# Patient Record
Sex: MALE | Race: WHITE | Employment: UNEMPLOYED | URBAN - METROPOLITAN AREA
[De-identification: names, ages, dates, MRNs, and addresses within clinical notes are randomized per-mention and may not be internally consistent; named-entity substitution may affect disease eponyms.]

---

## 2021-05-05 NOTE — PROGRESS NOTES
[address]   Cardiac Consultation    Referring Provider:  No primary care provider on file. Chief Complaint   Patient presents with    New Patient    Hypertension        History of Present Illness:   Letha Nascimento is a 45 y.o. male with a history of hypertension, hyperlipidemia and diabetes. He reports a long history of difficult to control HTN. He has not tolerated beta blockers or lisinopril in the past. He was recently seen in the ED at [facility] for HTN emergency, BP was 224/116 he reports he had been feeling poorly,  dizzy and lightheaded, this is the second ED visit in a year for this. He was unable to tolerate the Coreg he was started on. He reports he drinks alcohol regularly , states he has cut back, does not smoke. He is planning to travel to a remote area of [place] next week, he will be staying there for an extended period. Past Medical History:   has a past medical history of Anxiety, Hepatitis C, and Type 2 diabetes mellitus ([address] Surgical History:   has a past surgical history that includes hernia repair (Left) and Cervical discectomy. Social History:   reports that he has never smoked. He has never used smokeless tobacco. He reports current alcohol use. He reports that he does not use drugs. Family History:  He was adopted. Family history is unknown by patient. Home Medications:  Prior to Admission medications    Medication Sig Start Date End Date Taking? Authorizing Provider   amLODIPine (NORVASC) 10 MG tablet Take 10 mg by mouth daily 4/24/21 5/24/21 Yes Historical Provider, MD   diazePAM (VALIUM) 5 MG tablet Take 5 mg by mouth daily as needed. Yes Historical Provider, MD   HYDROcodone-acetaminophen (NORCO) 5-325 MG per tablet Take 1 tablet by mouth daily as needed.    Yes Historical Provider, MD   insulin NPH (HUMULIN N;NOVOLIN N) 100 UNIT/ML injection vial Inject 15-20 Units into the skin daily as needed   Yes Historical Provider, MD valsartan (DIOVAN) 160 MG tablet Take 0.5 tablets by mouth daily Take 0.5 tablet by mouth daily 5/10/21  Yes Jorge Morales MD        Allergies:  Sulfa antibiotics     Review of Systems:   · Constitutional: there has been no unanticipated weight loss. There's been no change in energy level, sleep pattern, or activity level. · Eyes: No visual changes or diplopia. No scleral icterus. · ENT: No Headaches, hearing loss or vertigo. No mouth sores or sore throat. · Cardiovascular: Reviewed in HPI  · Respiratory: No cough or wheezing, no sputum production. No hematemesis. · Gastrointestinal: No abdominal pain, appetite loss, blood in stools. No change in bowel or bladder habits. · Genitourinary: No dysuria, trouble voiding, or hematuria. · Musculoskeletal:  No gait disturbance, weakness or joint complaints. · Integumentary: No rash or pruritis. · Neurological: No headache, diplopia, change in muscle strength, numbness or tingling. No change in gait, balance, coordination, mood, affect, memory, mentation, behavior. · Psychiatric: No anxiety, no depression. · Endocrine: No malaise, fatigue or temperature intolerance. No excessive thirst, fluid intake, or urination. No tremor. · Hematologic/Lymphatic: No abnormal bruising or bleeding, blood clots or swollen lymph nodes. · Allergic/Immunologic: No nasal congestion or hives.     Physical Examination:    Vitals:    05/10/21 0825   BP: (!) 142/88   Pulse: 73   SpO2: 98%        Wt Readings from Last 1 Encounters:   05/10/21 198 lb 6.4 oz (90 kg)       Constitutional and General Appearance: NAD   Skin:good turgor,intact without lesions  HEENT: EOMI ,normal  Neck:no JVD  Respiratory:  · Normal excursion and expansion without use of accessory muscles  · Resp Auscultation: Normal breath sounds without dullness  Cardiovascular:  · The apical impulses not displaced  ·  +Murmur ,systolic ejection murmur  · Cervical veins are not engorged  · The carotid upstroke is normal in amplitude and contour without delay or bruit  · Peripheral pulses are symmetrical and full  · There is no clubbing, cyanosis of the extremities. · No edema  · Femoral Arteries: 2+ and equal  · Pedal Pulses: 2+ and equal   Abdomen:  · No masses or tenderness  · Liver/Spleen: No Abnormalities Noted  Neurological/Psychiatric:  · Alert and oriented in all spheres  · Moves all extremities well  · Exhibits normal gait balance and coordination  · No abnormalities of mood, affect, memory, mentation, or behavior are noted    Echo 4/23/21   1. Left ventricle: The cavity size was normal. Wall thickness was     normal. Systolic function was normal. The estimated ejection     fraction was in the range of 55% to 60%. Wall motion was normal;     there were no regional wall motion abnormalities. Doppler     parameters are consistent with abnormal left ventricular     relaxation (grade 1 diastolic dysfunction). 2. Aortic valve: The valve was possibly bicuspid. The leaflets were     moderately calcified. Cusp separation was reduced. Transvalvular     velocity was increased, due to stenosis. There was mild to     moderate stenosis. Peak velocity (S): 2.93m/sec. Mean gradient     (S): 21mm Hg. 3. Right ventricle: The cavity size was normal. Wall thickness was     normal. Systolic function was normal.  4. Pulmonary Systolic pressure could not be accurately     estimated. Assessment:     Essential hypertension -Blood pressure (!) 142/88, pulse 73, height 5' 10\" (1.778 m), weight 198 lb 6.4 oz (90 kg), SpO2 98 %.   Recheck by me R 168/82  L 164/84   Stop carvedilol (COREG) 12.5 MG tablet   Start Valsartan 80mg daily   Call the office in 1 week with your BP readings - we may need to increase your dose - [phone]   Goal is DBP under 80         Type 2 diabetes mellitus without complication, without long-term current use of insulin ([facility]) 4/22/21 > A1C 8.8 - not well controlled     Aortic valve stenosis/ Bicuspid valve - Echo 4/23/21>  mild to moderate stenosis,  valve was possibly bicuspid    Bilateral carotid artery stenosis - Carotid Doppler > 4/24/21> BICA 16-49% stenosis     Alcohol use - reports he has cut back strongly advised cessation          Plan:  Irina Tipton has a stable cardiac status. Cardiac test and lab results personally reviewed by me during this office visit and discussed. Stop carvedilol (COREG) 12.5 MG tablet   Start Valsartan 80mg daily   Call the office in 1 week with your BP readings - we may need to increase your dose - [phone]   Goal is SBP under 80   Continue risk factor modifications. Call for any change in symptoms, call to report any changes in shortness of breath or development of chest pain with activity. Return for regular follow up in 6 months with echo. I appreciate the opportunity of cooperating in the care of this individual.    Leobardo Montes. Jaclyn Mckeon M.D., [facility] [place]    Patient's problem list, medications, allergies, past medical, surgical, social and family histories were reviewed and updated as appropriate. Scribe's attestation: This note was scribed in the presence of Dr. Jaclyn Mckeon MD, by Tiffanie Finnegan RN. The scribe's documentation has been prepared under my direction and personally reviewed by me in its entirety. I confirm that the note above accurately reflects all work, treatment, procedures, and medical decision making performed by me.

## 2021-05-10 ENCOUNTER — OFFICE VISIT (OUTPATIENT)
Dept: CARDIOLOGY CLINIC | Age: 51
End: 2021-05-10

## 2021-05-10 VITALS
WEIGHT: 198.4 LBS | HEART RATE: 73 BPM | SYSTOLIC BLOOD PRESSURE: 142 MMHG | BODY MASS INDEX: 28.4 KG/M2 | OXYGEN SATURATION: 98 % | HEIGHT: 70 IN | DIASTOLIC BLOOD PRESSURE: 88 MMHG

## 2021-05-10 DIAGNOSIS — E11.9 TYPE 2 DIABETES MELLITUS WITHOUT COMPLICATION, WITHOUT LONG-TERM CURRENT USE OF INSULIN (HCC): ICD-10-CM

## 2021-05-10 DIAGNOSIS — I10 ESSENTIAL HYPERTENSION: Primary | ICD-10-CM

## 2021-05-10 DIAGNOSIS — I35.0 NONRHEUMATIC AORTIC VALVE STENOSIS: ICD-10-CM

## 2021-05-10 DIAGNOSIS — Z78.9 ALCOHOL USE: ICD-10-CM

## 2021-05-10 DIAGNOSIS — I65.23 BILATERAL CAROTID ARTERY STENOSIS: ICD-10-CM

## 2021-05-10 PROCEDURE — 99203 OFFICE O/P NEW LOW 30 MIN: CPT | Performed by: INTERNAL MEDICINE

## 2021-05-10 RX ORDER — VALSARTAN 160 MG/1
80 TABLET ORAL DAILY
Qty: 90 TABLET | Refills: 3 | Status: SHIPPED | OUTPATIENT
Start: 2021-05-10 | End: 2021-11-10 | Stop reason: ALTCHOICE

## 2021-05-10 RX ORDER — HYDROCODONE BITARTRATE AND ACETAMINOPHEN 5; 325 MG/1; MG/1
1 TABLET ORAL DAILY PRN
Status: ON HOLD | COMMUNITY
End: 2022-03-15 | Stop reason: HOSPADM

## 2021-05-10 RX ORDER — CARVEDILOL 25 MG/1
12.5 TABLET ORAL 2 TIMES DAILY
COMMUNITY
Start: 2021-04-24 | End: 2021-05-10 | Stop reason: ALTCHOICE

## 2021-05-10 RX ORDER — AMLODIPINE BESYLATE 10 MG/1
10 TABLET ORAL DAILY
COMMUNITY
Start: 2021-04-24 | End: 2022-03-10

## 2021-05-10 RX ORDER — DIAZEPAM 5 MG/1
5 TABLET ORAL DAILY PRN
COMMUNITY
End: 2022-03-10

## 2021-05-10 NOTE — PATIENT INSTRUCTIONS
Stop carvedilol (COREG) 12.5 MG tablet   Start Valsartan 80mg daily   Call the office in 1 week with your BP readings - we may need to increase your dose - [phone]   Goal is SBP under 80

## 2021-10-14 NOTE — PROGRESS NOTES
[facility], [place], [facility] by [facility]   Cardiac Follow Up     Referring Provider:  AKHIL Hernández - CNP     Chief Complaint   Patient presents with    6 Month Follow-Up    Hypertension        History of Present Illness:   Laurel Garcia is a 45 y.o. male with a history of hypertension, hyperlipidemia and diabetes. He reports a long history of difficult to control HTN. He has not tolerated beta blockers or lisinopril in the past, he was unable to tolerate coreg or diovan. He has a recent back injury, he is in a lot of pain, . He denies any symptoms of headache, dizziness. BP has been elevated at home. Discussed echok results- unchanged from April. States he also has trouble with ability to think      Past Medical History:   has a past medical history of Anxiety, Hepatitis C, and Type 2 diabetes mellitus ([address] also hypertension and now bicuspid aortic valve    Surgical History:   has a past surgical history that includes hernia repair (Left) and Cervical discectomy. Social History:   reports that he has never smoked. He has never used smokeless tobacco. He reports current alcohol use. He reports that he does not use drugs. Family History:  He was adopted. Family history is unknown by patient. Home Medications:  Prior to Admission medications    Medication Sig Start Date End Date Taking? Authorizing Provider   gabapentin (NEURONTIN) 300 MG capsule Take 1 capsule by mouth 4 times daily for 90 doses. 10/26/21 11/18/21 Yes Joseph Felipe MD   diazePAM (VALIUM) 5 MG tablet Take 5 mg by mouth daily as needed. Yes Historical Provider, MD   HYDROcodone-acetaminophen (NORCO) 5-325 MG per tablet Take 1 tablet by mouth daily as needed.    Yes Historical Provider, MD   insulin NPH (HUMULIN N;NOVOLIN N) 100 UNIT/ML injection vial Inject 15-20 Units into the skin 2 times daily (before meals)    Yes Historical Provider, MD   amLODIPine (NORVASC) 10 MG tablet Take 10 mg by mouth daily 4/24/21 5/24/21  Historical Provider, MD        Allergies:  Sulfa antibiotics     Review of Systems:   · Constitutional: there has been no unanticipated weight loss. There's been no change in energy level, sleep pattern, or activity level. · Eyes: No visual changes or diplopia. No scleral icterus. · ENT: No Headaches, hearing loss or vertigo. No mouth sores or sore throat. · Cardiovascular: Reviewed in HPI  · Respiratory: No cough or wheezing, no sputum production. No hematemesis. · Gastrointestinal: No abdominal pain, appetite loss, blood in stools. No change in bowel or bladder habits. · Genitourinary: No dysuria, trouble voiding, or hematuria. · Musculoskeletal:  No gait disturbance, weakness or joint complaints. · Integumentary: No rash or pruritis. · Neurological: No headache, diplopia, change in muscle strength, numbness or tingling. No change in gait, balance, coordination, mood, affect, memory, mentation, behavior. · Psychiatric: No anxiety, no depression. · Endocrine: No malaise, fatigue or temperature intolerance. No excessive thirst, fluid intake, or urination. No tremor. · Hematologic/Lymphatic: No abnormal bruising or bleeding, blood clots or swollen lymph nodes. · Allergic/Immunologic: No nasal congestion or hives.     Physical Examination:    Vitals:    11/10/21 1038   BP: (!) 150/90   Pulse: 84   SpO2: 98%        Wt Readings from Last 1 Encounters:   11/10/21 205 lb (93 kg)       Constitutional and General Appearance: NAD   Skin:good turgor,intact without lesions  HEENT: EOMI ,normal  Neck:no JVD  Respiratory:  · Normal excursion and expansion without use of accessory muscles  · Resp Auscultation: Normal breath sounds without dullness  Cardiovascular:  · The apical impulses not displaced  ·  +Murmur ,systolic ejection murmur  · Cervical veins are not engorged  · The carotid upstroke is normal in amplitude and contour without delay or bruit  · Peripheral pulses are symmetrical and full  · There is no clubbing, cyanosis of the extremities. · No edema  · Femoral Arteries: 2+ and equal  · Pedal Pulses: 2+ and equal   Abdomen:  · No masses or tenderness  · Liver/Spleen: No Abnormalities Noted  Neurological/Psychiatric:  · Alert and oriented in all spheres  · Moves all extremities well  · Exhibits normal gait balance and coordination  · No abnormalities of mood, affect, memory, mentation, or behavior are noted    Echo 4/23/21   1. Left ventricle: The cavity size was normal. Wall thickness was     normal. Systolic function was normal. The estimated ejection     fraction was in the range of 55% to 60%. Wall motion was normal;     there were no regional wall motion abnormalities. Doppler     parameters are consistent with abnormal left ventricular     relaxation (grade 1 diastolic dysfunction). 2. Aortic valve: The valve was possibly bicuspid. The leaflets were     moderately calcified. Cusp separation was reduced. Transvalvular     velocity was increased, due to stenosis. There was mild to     moderate stenosis. Peak velocity (S): 2.93m/sec. Mean gradient     (S): 21mm Hg. 3. Right ventricle: The cavity size was normal. Wall thickness was     normal. Systolic function was normal.  4. Pulmonary Systolic pressure could not be accurately     estimated. Echo 11/10/21   Summary   -Normal left ventricle size, moderate concentric wall thickness and systolic   function with an estimated ejection fraction of 60%. -No regional wall motion abnormalities are seen. -There is reversal of E/A inflow velocities across the mitral valve. -Impaired relaxation compatible with diastolic dysfunction. E/e\"=10.8.   -The aortic valve appears bicuspid . -Moderate aortic stenosis with a peak velocity of 3m/s and a mean pressure   gradient of 22mmHg.   -Trivial tricuspid regurgitation.   -Estimated pulmonary artery systolic pressure is normal at 31 mmHg assuming   a right atrial pressure of 3 mmHg.            Assessment:     Essential hypertension -Blood pressure (!) 150/90, pulse 84, height 5' 10\" (1.778 m), weight 205 lb (93 kg), SpO2 98 %. At last visit -Stopped carvedilol (COREG) 12.5 MG tablet   Started  Valsartan 80mg daily -unable to tolerate   He is reluctant to try another medication- but will attempt Cardura- he is in significant pain due to his back. Type 2 diabetes mellitus without complication, without long-term current use of insulin ([address] 4/22/21 > A1C 8.8 - not well controlled     Aortic valve stenosis/ Bicuspid valve  - Echo 4/23/21>  mild to moderate stenosis,  valve was possibly bicuspid  11/10/21> stable -unchanged since 4/21      Bilateral carotid artery stenosis - Carotid Doppler > 4/24/21> BICA 16-49% stenosis              Plan: Echo 11/10/21 read by me and results discussed with the patient. Ronni Stoll has a stable cardiac status. Cardiac test and lab results personally reviewed by me during this office visit and discussed. Referral to Dr Son Nance sent for 2nd opinion if needed. Wife and him concerned about f/up with Dr Edith Veras although I view him as a good surgeon  Start Cardura 1mg nightly   VV with PCP today -will provide copy of office note   Continue risk factor modifications. Call for any change in symptoms, call to report any changes in shortness of breath or development of chest pain with activity. Return for regular follow up in 6 months. I appreciate the opportunity of cooperating in the care of this individual.    Ramon Fregoso. Petar Garcia M.D., [facility] [place]    Patient's problem list, medications, allergies, past medical, surgical, social and family histories were reviewed and updated as appropriate. Scribe's attestation: This note was scribed in the presence of Dr. Petar Garcia MD, by Cortland Schlatter RN. The scribe's documentation has been prepared under my direction and personally reviewed by me in its entirety.  I confirm that the note above accurately reflects all work, treatment, procedures, and medical decision making performed by me.

## 2021-10-26 ENCOUNTER — OFFICE VISIT (OUTPATIENT)
Dept: ORTHOPEDIC SURGERY | Age: 51
End: 2021-10-26

## 2021-10-26 VITALS — HEIGHT: 70 IN | WEIGHT: 198 LBS | BODY MASS INDEX: 28.35 KG/M2

## 2021-10-26 DIAGNOSIS — M54.50 LUMBAR PAIN: ICD-10-CM

## 2021-10-26 DIAGNOSIS — M51.26 HNP (HERNIATED NUCLEUS PULPOSUS), LUMBAR: Primary | ICD-10-CM

## 2021-10-26 PROCEDURE — 99203 OFFICE O/P NEW LOW 30 MIN: CPT | Performed by: ORTHOPAEDIC SURGERY

## 2021-10-26 RX ORDER — GABAPENTIN 300 MG/1
300 CAPSULE ORAL 4 TIMES DAILY
Qty: 90 CAPSULE | Refills: 0 | Status: SHIPPED | OUTPATIENT
Start: 2021-10-26 | End: 2022-03-10

## 2021-10-26 NOTE — PROGRESS NOTES
New Patient: LUMBAR SPINE    Referring Provider:  Referral, Self    CHIEF COMPLAINT:    Chief Complaint   Patient presents with    Back Pain     lumbar       HISTORY OF PRESENT ILLNESS:    Mr. Carin Alvarez  is a pleasant 45 y.o. male who presents today for evaluation of left buttock and leg pain. He notes his symptoms began with a lifting injury on July 5, 2021. They have steadily increased since that time. His pain radiates posteriorly from his buttocks to his left calf. He rates the intensity of his leg pain 6/10 in his low back pain 0/10. He denies saddle anesthesia or bowel bladder abnormality    Current/Past Treatment:   · Physical Therapy: None  · Chiropractic: 14 visits not helping  · Injection: None  · Medications: None    Past Medical History:   Past Medical History:   Diagnosis Date    Anxiety     Hepatitis C     Type 2 diabetes mellitus (HCC)       Past Surgical History:     Past Surgical History:   Procedure Laterality Date    CERVICAL DISCECTOMY      HERNIA REPAIR Left      Current Medications:     Current Outpatient Medications:     amLODIPine (NORVASC) 10 MG tablet, Take 10 mg by mouth daily, Disp: , Rfl:     diazePAM (VALIUM) 5 MG tablet, Take 5 mg by mouth daily as needed. , Disp: , Rfl:     HYDROcodone-acetaminophen (NORCO) 5-325 MG per tablet, Take 1 tablet by mouth daily as needed. , Disp: , Rfl:     insulin NPH (HUMULIN N;NOVOLIN N) 100 UNIT/ML injection vial, Inject 15-20 Units into the skin daily as needed, Disp: , Rfl:     valsartan (DIOVAN) 160 MG tablet, Take 0.5 tablets by mouth daily Take 0.5 tablet by mouth daily, Disp: 90 tablet, Rfl: 3  Allergies:  Sulfa antibiotics  Social History:    reports that he has never smoked. He has never used smokeless tobacco. He reports current alcohol use. He reports that he does not use drugs.   Family History:   Family History   Adopted: Yes   Family history unknown: Yes       REVIEW OF SYSTEMS: Full ROS noted & scanned   CONSTITUTIONAL: Denies unexplained weight loss, fevers, chills or fatigue  NEUROLOGICAL: Denies unsteady gait or progressive weakness  MUSCULOSKELETAL: Denies joint swelling or redness  PSYCHOLOGICAL: Denies anxiety, depression   SKIN: Denies skin changes, delayed healing, rash, itching   HEMATOLOGIC: Denies easy bleeding or bruising  ENDOCRINE: Denies excessive thirst, urination, heat/cold  RESPIRATORY: Denies current dyspnea, cough  GI: Denies nausea, vomiting, diarrhea   : Denies bowel or bladder issues      PHYSICAL EXAM:    Vitals: Height 5' 10\" (1.778 m), weight 198 lb (89.8 kg). GENERAL EXAM:  · General Apparence: Patient is adequately groomed with no evidence of malnutrition. · Orientation: The patient is oriented to time, place and person. · Mood & Affect:The patient's mood and affect are appropriate. · Vascular: Examination reveals no swelling tenderness in upper or lower extremities. Good capillary refill. · Lymphatic: The lymphatic examination bilaterally reveals all areas to be without enlargement or induration  · Sensation: Sensation is intact without deficit  · Coordination/Balance: Good coordination     LUMBAR/SACRAL EXAMINATION:  · Inspection: Local inspection shows no step-off or bruising. Lumbar alignment is normal.  Sagittal and Coronal balance is neutral.      · Palpation:   No evidence of tenderness at the midline. No tenderness bilaterally at the paraspinal or trochanters. There is no step-off or paraspinal spasm. · Range of Motion: Lumbar flexion, extension and rotation are mildly limited due to pain. · Strength:   Strength testing is 5/5 in all muscle groups tested. · Special Tests:   Straight leg raise positive on the left and crossed SLR negative. Leg length and pelvis level. · Skin: There are no rashes, ulcerations or lesions. · Reflexes: Reflexes are symmetrically 2+ at the patellar and ankle tendons. Clonus absent bilaterally at the feet.   · Gait & station: normal, patient ambulates without assistance    · Additional Examinations:   · RIGHT LOWER EXTREMITY: Inspection/examination of the right lower extremity does not show any tenderness, deformity or injury. Range of motion is unremarkable. There is no gross instability. There are no rashes, ulcerations or lesions. Strength and tone are normal.    · LEFT LOWER EXTREMITY:  Inspection/examination of the left lower extremity does not show any tenderness, deformity or injury. Range of motion is unremarkable. There is no gross instability. There are no rashes, ulcerations or lesions. Strength and tone are normal.    Diagnostic Testing:    I reviewed AP and lateral x-rays of the lumbar spine were obtained in the office today. Those show mild disc degeneration L5-S1    Impression:   Lumbar disc herniation with radiculopathy    Plan:    Discussed treatment options including observation, physical therapy, epidural injection, and additional imaging. He would like to proceed with gabapentin and a lumbar MRI.   He is not willing to try oral steroids or NSAIDs

## 2021-11-08 ENCOUNTER — TELEPHONE (OUTPATIENT)
Dept: ORTHOPEDIC SURGERY | Age: 51
End: 2021-11-08

## 2021-11-08 NOTE — TELEPHONE ENCOUNTER
----- Message from Raul Burris MD sent at 11/4/2021 11:24 AM EDT -----  Lumbar MRI  Disc herniation  Could try lumbar LISA or come in to discuss options

## 2021-11-08 NOTE — TELEPHONE ENCOUNTER
Sp[facility] to the patient and [facility] checking with Dr. Corey Zamora if he will see him.

## 2021-11-08 NOTE — TELEPHONE ENCOUNTER
Test Results     Type of Test: MRI  Date of Test: 11/2/21  Location of Test: [facility]  Patient Contact Number: [phone]    [phone]    WIFE CALLING TO FOLLOW UP ON  RESULTS OF HIS MRI. PLEASE CALL BACK AT THE ABOVE NUMBERS.

## 2021-11-09 DIAGNOSIS — M51.26 HNP (HERNIATED NUCLEUS PULPOSUS), LUMBAR: ICD-10-CM

## 2021-11-09 DIAGNOSIS — M54.50 LUMBAR PAIN: Primary | ICD-10-CM

## 2021-11-10 ENCOUNTER — PROCEDURE VISIT (OUTPATIENT)
Dept: CARDIOLOGY CLINIC | Age: 51
End: 2021-11-10

## 2021-11-10 ENCOUNTER — OFFICE VISIT (OUTPATIENT)
Dept: CARDIOLOGY CLINIC | Age: 51
End: 2021-11-10

## 2021-11-10 VITALS
OXYGEN SATURATION: 98 % | SYSTOLIC BLOOD PRESSURE: 150 MMHG | WEIGHT: 205 LBS | BODY MASS INDEX: 29.35 KG/M2 | HEIGHT: 70 IN | HEART RATE: 84 BPM | DIASTOLIC BLOOD PRESSURE: 90 MMHG

## 2021-11-10 DIAGNOSIS — Z78.9 ALCOHOL USE: ICD-10-CM

## 2021-11-10 DIAGNOSIS — I65.23 BILATERAL CAROTID ARTERY STENOSIS: ICD-10-CM

## 2021-11-10 DIAGNOSIS — I35.0 NONRHEUMATIC AORTIC VALVE STENOSIS: ICD-10-CM

## 2021-11-10 DIAGNOSIS — M54.9 BACK PAIN, UNSPECIFIED BACK LOCATION, UNSPECIFIED BACK PAIN LATERALITY, UNSPECIFIED CHRONICITY: ICD-10-CM

## 2021-11-10 DIAGNOSIS — I35.0 NONRHEUMATIC AORTIC VALVE STENOSIS: Primary | ICD-10-CM

## 2021-11-10 DIAGNOSIS — Q23.1 BICUSPID AORTIC VALVE: ICD-10-CM

## 2021-11-10 DIAGNOSIS — E11.9 TYPE 2 DIABETES MELLITUS WITHOUT COMPLICATION, WITHOUT LONG-TERM CURRENT USE OF INSULIN (HCC): ICD-10-CM

## 2021-11-10 DIAGNOSIS — I10 ESSENTIAL HYPERTENSION: Primary | ICD-10-CM

## 2021-11-10 LAB
LV EF: 60 %
LVEF MODALITY: NORMAL

## 2021-11-10 PROCEDURE — 93306 TTE W/DOPPLER COMPLETE: CPT | Performed by: INTERNAL MEDICINE

## 2021-11-10 PROCEDURE — 99214 OFFICE O/P EST MOD 30 MIN: CPT | Performed by: INTERNAL MEDICINE

## 2021-11-10 RX ORDER — DOXAZOSIN MESYLATE 1 MG/1
1 TABLET ORAL NIGHTLY
Qty: 90 TABLET | Refills: 3 | Status: SHIPPED | OUTPATIENT
Start: 2021-11-10

## 2021-11-10 NOTE — PATIENT INSTRUCTIONS
[facility], [address] [place] [address],[address], [place]  [address] [facility], [facility] [address] [facility]  Ph: [phone]  [facility]:[phone]    Start Cardura nightly

## 2021-11-12 ENCOUNTER — TELEPHONE (OUTPATIENT)
Dept: ORTHOPEDIC SURGERY | Age: 51
End: 2021-11-12

## 2022-01-26 ENCOUNTER — TELEPHONE (OUTPATIENT)
Dept: ORTHOPEDIC SURGERY | Age: 52
End: 2022-01-26

## 2022-03-09 ENCOUNTER — HOSPITAL ENCOUNTER (OUTPATIENT)
Age: 52
Discharge: HOME | End: 2022-03-09
Payer: COMMERCIAL

## 2022-03-09 DIAGNOSIS — Z01.818 PREOP TESTING: ICD-10-CM

## 2022-03-09 LAB
ANION GAP SERPL CALCULATED.3IONS-SCNC: 17 MMOL/L (ref 3–16)
APTT: 36.5 SEC (ref 26.2–38.6)
BUN BLDV-MCNC: 27 MG/DL (ref 7–20)
CALCIUM SERPL-MCNC: 9.7 MG/DL (ref 8.3–10.6)
CHLORIDE BLD-SCNC: 100 MMOL/L (ref 99–110)
CO2: 23 MMOL/L (ref 21–32)
CREAT SERPL-MCNC: 1.6 MG/DL (ref 0.9–1.3)
GFR AFRICAN AMERICAN: 55
GFR NON-AFRICAN AMERICAN: 45
GLUCOSE BLD-MCNC: 141 MG/DL (ref 70–99)
HCT VFR BLD CALC: 40.3 % (ref 40.5–52.5)
HEMOGLOBIN: 13.8 G/DL (ref 13.5–17.5)
INR BLD: 0.9 (ref 0.88–1.12)
MCH RBC QN AUTO: 30 PG (ref 26–34)
MCHC RBC AUTO-ENTMCNC: 34.3 G/DL (ref 31–36)
MCV RBC AUTO: 87.6 FL (ref 80–100)
PDW BLD-RTO: 12.9 % (ref 12.4–15.4)
PLATELET # BLD: 263 K/UL (ref 135–450)
PMV BLD AUTO: 8.2 FL (ref 5–10.5)
POTASSIUM SERPL-SCNC: 5 MMOL/L (ref 3.5–5.1)
PROTHROMBIN TIME: 10.1 SEC (ref 9.9–12.7)
RBC # BLD: 4.6 M/UL (ref 4.2–5.9)
SODIUM BLD-SCNC: 140 MMOL/L (ref 136–145)
WBC # BLD: 4.1 K/UL (ref 4–11)

## 2022-03-09 PROCEDURE — U0005 INFEC AGEN DETEC AMPLI PROBE: HCPCS

## 2022-03-09 PROCEDURE — U0003 INFECTIOUS AGENT DETECTION BY NUCLEIC ACID (DNA OR RNA); SEVERE ACUTE RESPIRATORY SYNDROME CORONAVIRUS 2 (SARS-COV-2) (CORONAVIRUS DISEASE [COVID-19]), AMPLIFIED PROBE TECHNIQUE, MAKING USE OF HIGH THROUGHPUT TECHNOLOGIES AS DESCRIBED BY CMS-2020-01-R: HCPCS

## 2022-03-09 PROCEDURE — 85730 THROMBOPLASTIN TIME PARTIAL: CPT

## 2022-03-09 PROCEDURE — 36415 COLL VENOUS BLD VENIPUNCTURE: CPT

## 2022-03-09 PROCEDURE — 85610 PROTHROMBIN TIME: CPT

## 2022-03-09 PROCEDURE — 85027 COMPLETE CBC AUTOMATED: CPT

## 2022-03-09 PROCEDURE — 80048 BASIC METABOLIC PNL TOTAL CA: CPT

## 2022-03-10 LAB — SARS-COV-2: NOT DETECTED

## 2022-03-10 NOTE — PROGRESS NOTES
Name_______________________________________Printed:____________________  Date and time of xmylikc__[id number]____[id number]__________________Anzyjqz Time:_1145  main_______________   1. The instructions given regarding when and if a patient needs to stop oral intake prior to surgery varies. Follow the specific instructions you were given                  __x_Nothing to eat or to drink after Midnight the night before.                   ____Carbo loading or ERAS instructions will be given to select patients-if you have been given those instructions -please do the following                           The evening before your surgery after dinner before midnight drink 40 ounces of gatorade. If you are diabetic use sugar free. The morning of surgery drink 40 ounces of water. This needs to be finished 3 hours prior to your surgery start time. 2. Take the following pills with a small sip of water on the morning of surgery_amlodipine, pain pill__________________________________________________                  Do not take blood pressure medications ending in pril or sartan the chasidy prior to surgery or the morning of surgery_   3. Aspirin, Ibuprofen, Advil, Naproxen, Vitamin E and other Anti-inflammatory products and supplements should be stopped for 5 -7days before surgery or as directed by your physician. 4. Check with your Doctor regarding stopping Plavix, Coumadin,Eliquis, Lovenox,Effient,Pradaxa,Xarelto, Fragmin or other blood thinners and follow their instructions. 5. Do not smoke, and do not drink any alcoholic beverages 24 hours prior to surgery. This includes NA Beer. Refrain from the usage of any recreational drugs. 6. You may brush your teeth and gargle the morning of surgery. DO NOT SWALLOW WATER   7. You MUST make arrangements for a responsible adult to stay on site while you are here and take you home after your surgery. You will not be allowed to leave alone or drive yourself home.   It is strongly suggested someone stay with you the first 24 hrs. Your surgery will be cancelled if you do not have a ride home. 8. A parent/legal guardian must accompany a child scheduled for surgery and plan to stay at the hospital until the child is discharged. Please do not bring other children with you. 9. Please wear simple, loose fitting clothing to the hospital.  Bimal Reed not bring valuables (money, credit cards, checkbooks, etc.) Do not wear any makeup (including no eye makeup) or nail polish on your fingers or toes. 10. DO NOT wear any jewelry or piercings on day of surgery. All body piercing jewelry must be removed. 11. If you have ___dentures, they will be removed before going to the OR; we will provide you a container. If you wear ___contact lenses or _x__glasses, they will be removed; please bring a case for them. 12. Please see your family doctor/pediatrician for a history & physical and/or concerning medications. Bring any test results/reports from your physician's office. PCP__________________Phone___________H&P Appt. Date________             13 If you  have a Living Will and Durable Power of  for Healthcare, please bring in a copy. 15. Notify your Surgeon if you develop any illness between now and surgery  time, cough, cold, fever, sore throat, nausea, vomiting, etc.  Please notify your surgeon if you experience dizziness, shortness of breath or blurred vision between now & the time of your surgery             15. DO NOT shave your operative site 96 hours prior to surgery. For face & neck surgery, men may use an electric razor 48 hours prior to surgery. 16. Shower the night before or morning of surgery using an antibacterial soap or as you have been instructed. 17. To provide excellent care visitors will be limited to one in the room at any given time. 18.  Please bring picture ID and insurance card.              19.  Visit our web site for additional information:  [email or web address]              20.During flu season no children under the age of 15 are permitted in the hospital for the safety of all patients. 21. If you take a long acting insulin in the evening only  take half of your usual  dose the night  before your procedure              22. If you use a c-pap please bring DOS if staying overnight,             23.For your convenience [facility] has a pharmacy on site to fill your prescriptions. 24. If you use oxygen and have a portable tank please bring it  with you the DOS             25. Bring a complete list of all your medications with name and dose include any supplements. 26. Other__________________________________________   *Please call pre admission testing if you any further questions   Agapito Castellon   [address]    [place]  [phone]   [facility] [address] JOANNE KHAN   [phone]           COVID TESTING    _x__ Covid test to be done 3-5 days prior to scheduled surgery -patient aware they are REQUIRED to bring a copy of the negative result DOS-if they receive a positive result to notify their surgeon         If known - indicate where patient is getting covid test done __Mff 3/9/2022_________________________________________________________    ___ Rapid - DOS    ___ Other___________________________________      [employer] POLICY(subject to change)    There is a one visitor policy at [facility] for all surgeries and endoscopies. Whether the visitor can stay or will be asked to wait in the car will depend on the current policy and if social distancing can be maintained. The policy is subject to change at any time. Please make sure the visitor has a cell phone that is on,charged and able to accept calls, as this may be the way that the staff communicates with them. Pain management is NO VISITOR policyThe patients ride is expected to remain in the car with a cell phone for communication. If the ride is leaving the hospital grounds please make sure they are back in time for pickup. Have the patient inform the staff on arrival what their rides plans are while the patient is in the facility. At the MAIN there is one visitor allowed. Please note that the visitor policy is subject to change. All above information reviewed with patient in person or by phone. Patient verbalizes understanding. All questions and concerns addressed.                                                                                                  Patient/Rep_patient___________________                                                                                                                                    PRE OP INSTRUCTIONS

## 2022-03-14 ENCOUNTER — TELEPHONE (OUTPATIENT)
Dept: CARDIOLOGY CLINIC | Age: 52
End: 2022-03-14

## 2022-03-14 NOTE — TELEPHONE ENCOUNTER
CARDIAC CLEARANCE     What type of procedure are you having? Right L5  F1     Which physician is performing your procedure? Dr Laureano Inch    When is your procedure scheduled for? 3/15/22    [facility] you [facility]  Are you taking Blood Thinners? If so what? (Name/dose/frequesncy)     Does the surgeon want you to stop your blood thinner? If so for how long? Phone Number and Contact Name for Physicians office: [phone]    Fax number to send information: [phone]      States they sent fax request on 2/28/22  And received confirmation.

## 2022-03-14 NOTE — LETTER
[address] [place], [place] [address]  [address]  Phone: [phone]  Fax: [phone]    Aviva Harmon MD        March 14, 2022     Patient: Yaquelin Hatfield   YOB: 1970   Date of Visit: 3/14/2022       To Whom It May Concern: It is my medical opinion that Birgit Cotto can proceed with surgery (Left Lumbar 5 - Sacral 1 Micro Hemilaminectomy and Discectomy) scheduled on 3/15/22 from the cardiac standpoint. If you have any questions or concerns, please don't hesitate to call.     Sincerely,        Aviva Harmon MD

## 2022-03-14 NOTE — TELEPHONE ENCOUNTER
Discussed with Dr. Marleni Cleaning. Patient was given approval to proceed with surgery (L5, S1 Microhemilaminectomy, Discectomy) for DOS on 3/9/22. This was signed by Dr. Marleni Cleaning on 1/28/22. Per Dr. Marleni Cleaning, patient is can proceed with surgery (left by Dr. Fernanda Abebe on 3/15/22.

## 2022-03-15 ENCOUNTER — HOSPITAL ENCOUNTER (OUTPATIENT)
Age: 52
Setting detail: OUTPATIENT SURGERY
Discharge: HOME | End: 2022-03-15
Attending: NEUROLOGICAL SURGERY | Admitting: NEUROLOGICAL SURGERY
Payer: COMMERCIAL

## 2022-03-15 ENCOUNTER — ANESTHESIA (OUTPATIENT)
Dept: OPERATING ROOM | Age: 52
End: 2022-03-15
Payer: COMMERCIAL

## 2022-03-15 ENCOUNTER — APPOINTMENT (OUTPATIENT)
Dept: GENERAL RADIOLOGY | Age: 52
End: 2022-03-15
Attending: NEUROLOGICAL SURGERY
Payer: COMMERCIAL

## 2022-03-15 ENCOUNTER — ANESTHESIA EVENT (OUTPATIENT)
Dept: OPERATING ROOM | Age: 52
End: 2022-03-15
Payer: COMMERCIAL

## 2022-03-15 VITALS
TEMPERATURE: 97.5 F | RESPIRATION RATE: 8 BRPM | SYSTOLIC BLOOD PRESSURE: 117 MMHG | OXYGEN SATURATION: 100 % | DIASTOLIC BLOOD PRESSURE: 80 MMHG

## 2022-03-15 VITALS
HEIGHT: 70 IN | BODY MASS INDEX: 29.35 KG/M2 | OXYGEN SATURATION: 98 % | WEIGHT: 205 LBS | DIASTOLIC BLOOD PRESSURE: 84 MMHG | TEMPERATURE: 97.3 F | SYSTOLIC BLOOD PRESSURE: 142 MMHG | RESPIRATION RATE: 12 BRPM | HEART RATE: 85 BPM

## 2022-03-15 DIAGNOSIS — M51.16 HERNIATION OF LUMBAR INTERVERTEBRAL DISC WITH RADICULOPATHY: ICD-10-CM

## 2022-03-15 DIAGNOSIS — Z01.818 PREOP TESTING: Primary | ICD-10-CM

## 2022-03-15 LAB
GLUCOSE BLD-MCNC: 229 MG/DL (ref 70–99)
GLUCOSE BLD-MCNC: 245 MG/DL (ref 70–99)
PERFORMED ON: ABNORMAL
PERFORMED ON: ABNORMAL

## 2022-03-15 PROCEDURE — 2580000003 HC RX 258: Performed by: NURSE ANESTHETIST, CERTIFIED REGISTERED

## 2022-03-15 PROCEDURE — 6360000002 HC RX W HCPCS: Performed by: NEUROLOGICAL SURGERY

## 2022-03-15 PROCEDURE — 3600000014 HC SURGERY LEVEL 4 ADDTL 15MIN: Performed by: NEUROLOGICAL SURGERY

## 2022-03-15 PROCEDURE — 2709999900 HC NON-CHARGEABLE SUPPLY: Performed by: NEUROLOGICAL SURGERY

## 2022-03-15 PROCEDURE — 6360000002 HC RX W HCPCS: Performed by: ANESTHESIOLOGY

## 2022-03-15 PROCEDURE — 3600000004 HC SURGERY LEVEL 4 BASE: Performed by: NEUROLOGICAL SURGERY

## 2022-03-15 PROCEDURE — 6360000002 HC RX W HCPCS: Performed by: NURSE ANESTHETIST, CERTIFIED REGISTERED

## 2022-03-15 PROCEDURE — 2580000003 HC RX 258: Performed by: ANESTHESIOLOGY

## 2022-03-15 PROCEDURE — 3700000001 HC ADD 15 MINUTES (ANESTHESIA): Performed by: NEUROLOGICAL SURGERY

## 2022-03-15 PROCEDURE — 7100000001 HC PACU RECOVERY - ADDTL 15 MIN: Performed by: NEUROLOGICAL SURGERY

## 2022-03-15 PROCEDURE — 7100000011 HC PHASE II RECOVERY - ADDTL 15 MIN: Performed by: NEUROLOGICAL SURGERY

## 2022-03-15 PROCEDURE — 3700000000 HC ANESTHESIA ATTENDED CARE: Performed by: NEUROLOGICAL SURGERY

## 2022-03-15 PROCEDURE — 2500000003 HC RX 250 WO HCPCS: Performed by: NURSE ANESTHETIST, CERTIFIED REGISTERED

## 2022-03-15 PROCEDURE — 7100000000 HC PACU RECOVERY - FIRST 15 MIN: Performed by: NEUROLOGICAL SURGERY

## 2022-03-15 PROCEDURE — 2720000010 HC SURG SUPPLY STERILE: Performed by: NEUROLOGICAL SURGERY

## 2022-03-15 PROCEDURE — 72020 X-RAY EXAM OF SPINE 1 VIEW: CPT

## 2022-03-15 PROCEDURE — 2500000003 HC RX 250 WO HCPCS: Performed by: NEUROLOGICAL SURGERY

## 2022-03-15 PROCEDURE — 7100000010 HC PHASE II RECOVERY - FIRST 15 MIN: Performed by: NEUROLOGICAL SURGERY

## 2022-03-15 RX ORDER — LIDOCAINE HYDROCHLORIDE 10 MG/ML
1 INJECTION, SOLUTION EPIDURAL; INFILTRATION; INTRACAUDAL; PERINEURAL
Status: DISCONTINUED | OUTPATIENT
Start: 2022-03-15 | End: 2022-03-15 | Stop reason: HOSPADM

## 2022-03-15 RX ORDER — SODIUM CHLORIDE 9 MG/ML
INJECTION, SOLUTION INTRAVENOUS CONTINUOUS PRN
Status: DISCONTINUED | OUTPATIENT
Start: 2022-03-15 | End: 2022-03-15 | Stop reason: SDUPTHER

## 2022-03-15 RX ORDER — HYDROMORPHONE HCL 110MG/55ML
0.5 PATIENT CONTROLLED ANALGESIA SYRINGE INTRAVENOUS EVERY 5 MIN PRN
Status: DISCONTINUED | OUTPATIENT
Start: 2022-03-15 | End: 2022-03-15 | Stop reason: HOSPADM

## 2022-03-15 RX ORDER — OXYCODONE HYDROCHLORIDE 5 MG/1
5 TABLET ORAL EVERY 4 HOURS PRN
Qty: 42 TABLET | Refills: 0 | Status: SHIPPED | OUTPATIENT
Start: 2022-03-15 | End: 2022-03-22

## 2022-03-15 RX ORDER — BUPIVACAINE HYDROCHLORIDE 5 MG/ML
INJECTION, SOLUTION EPIDURAL; INTRACAUDAL
Status: COMPLETED | OUTPATIENT
Start: 2022-03-15 | End: 2022-03-15

## 2022-03-15 RX ORDER — DEXAMETHASONE SODIUM PHOSPHATE 4 MG/ML
INJECTION, SOLUTION INTRA-ARTICULAR; INTRALESIONAL; INTRAMUSCULAR; INTRAVENOUS; SOFT TISSUE PRN
Status: DISCONTINUED | OUTPATIENT
Start: 2022-03-15 | End: 2022-03-15 | Stop reason: SDUPTHER

## 2022-03-15 RX ORDER — HYDRALAZINE HYDROCHLORIDE 20 MG/ML
10 INJECTION INTRAMUSCULAR; INTRAVENOUS
Status: DISCONTINUED | OUTPATIENT
Start: 2022-03-15 | End: 2022-03-15 | Stop reason: HOSPADM

## 2022-03-15 RX ORDER — ACETAMINOPHEN 325 MG/1
650 TABLET ORAL ONCE
Status: DISCONTINUED | OUTPATIENT
Start: 2022-03-15 | End: 2022-03-15

## 2022-03-15 RX ORDER — LABETALOL HYDROCHLORIDE 5 MG/ML
10 INJECTION, SOLUTION INTRAVENOUS
Status: DISCONTINUED | OUTPATIENT
Start: 2022-03-15 | End: 2022-03-15 | Stop reason: HOSPADM

## 2022-03-15 RX ORDER — VECURONIUM BROMIDE 1 MG/ML
INJECTION, POWDER, LYOPHILIZED, FOR SOLUTION INTRAVENOUS PRN
Status: DISCONTINUED | OUTPATIENT
Start: 2022-03-15 | End: 2022-03-15 | Stop reason: SDUPTHER

## 2022-03-15 RX ORDER — APREPITANT 40 MG/1
40 CAPSULE ORAL ONCE
Status: COMPLETED | OUTPATIENT
Start: 2022-03-15 | End: 2022-03-15

## 2022-03-15 RX ORDER — LIDOCAINE HYDROCHLORIDE 20 MG/ML
INJECTION, SOLUTION EPIDURAL; INFILTRATION; INTRACAUDAL; PERINEURAL PRN
Status: DISCONTINUED | OUTPATIENT
Start: 2022-03-15 | End: 2022-03-15 | Stop reason: SDUPTHER

## 2022-03-15 RX ORDER — MIDAZOLAM HYDROCHLORIDE 1 MG/ML
INJECTION INTRAMUSCULAR; INTRAVENOUS PRN
Status: DISCONTINUED | OUTPATIENT
Start: 2022-03-15 | End: 2022-03-15 | Stop reason: SDUPTHER

## 2022-03-15 RX ORDER — EPHEDRINE SULFATE/0.9% NACL/PF 50 MG/5 ML
SYRINGE (ML) INTRAVENOUS PRN
Status: DISCONTINUED | OUTPATIENT
Start: 2022-03-15 | End: 2022-03-15 | Stop reason: SDUPTHER

## 2022-03-15 RX ORDER — SODIUM CHLORIDE, SODIUM LACTATE, POTASSIUM CHLORIDE, CALCIUM CHLORIDE 600; 310; 30; 20 MG/100ML; MG/100ML; MG/100ML; MG/100ML
INJECTION, SOLUTION INTRAVENOUS CONTINUOUS
Status: DISCONTINUED | OUTPATIENT
Start: 2022-03-15 | End: 2022-03-15 | Stop reason: HOSPADM

## 2022-03-15 RX ORDER — SUCCINYLCHOLINE/SOD CL,ISO/PF 200MG/10ML
SYRINGE (ML) INTRAVENOUS PRN
Status: DISCONTINUED | OUTPATIENT
Start: 2022-03-15 | End: 2022-03-15 | Stop reason: SDUPTHER

## 2022-03-15 RX ORDER — ONDANSETRON 2 MG/ML
INJECTION INTRAMUSCULAR; INTRAVENOUS PRN
Status: DISCONTINUED | OUTPATIENT
Start: 2022-03-15 | End: 2022-03-15 | Stop reason: SDUPTHER

## 2022-03-15 RX ORDER — PHENYLEPHRINE HCL IN 0.9% NACL 1 MG/10 ML
SYRINGE (ML) INTRAVENOUS PRN
Status: DISCONTINUED | OUTPATIENT
Start: 2022-03-15 | End: 2022-03-15 | Stop reason: SDUPTHER

## 2022-03-15 RX ORDER — PROPOFOL 10 MG/ML
INJECTION, EMULSION INTRAVENOUS PRN
Status: DISCONTINUED | OUTPATIENT
Start: 2022-03-15 | End: 2022-03-15 | Stop reason: SDUPTHER

## 2022-03-15 RX ORDER — VANCOMYCIN HYDROCHLORIDE 1 G/20ML
INJECTION, POWDER, LYOPHILIZED, FOR SOLUTION INTRAVENOUS
Status: COMPLETED | OUTPATIENT
Start: 2022-03-15 | End: 2022-03-15

## 2022-03-15 RX ORDER — MEPERIDINE HYDROCHLORIDE 25 MG/ML
12.5 INJECTION INTRAMUSCULAR; INTRAVENOUS; SUBCUTANEOUS EVERY 5 MIN PRN
Status: DISCONTINUED | OUTPATIENT
Start: 2022-03-15 | End: 2022-03-15 | Stop reason: HOSPADM

## 2022-03-15 RX ORDER — FENTANYL CITRATE 50 UG/ML
INJECTION, SOLUTION INTRAMUSCULAR; INTRAVENOUS PRN
Status: DISCONTINUED | OUTPATIENT
Start: 2022-03-15 | End: 2022-03-15 | Stop reason: SDUPTHER

## 2022-03-15 RX ORDER — OXYCODONE HYDROCHLORIDE 5 MG/1
5 TABLET ORAL
Status: DISCONTINUED | OUTPATIENT
Start: 2022-03-15 | End: 2022-03-15 | Stop reason: HOSPADM

## 2022-03-15 RX ORDER — ONDANSETRON 2 MG/ML
4 INJECTION INTRAMUSCULAR; INTRAVENOUS
Status: DISCONTINUED | OUTPATIENT
Start: 2022-03-15 | End: 2022-03-15 | Stop reason: HOSPADM

## 2022-03-15 RX ADMIN — PROPOFOL 200 MG: 10 INJECTION, EMULSION INTRAVENOUS at 15:32

## 2022-03-15 RX ADMIN — LIDOCAINE HYDROCHLORIDE 100 MG: 20 INJECTION, SOLUTION EPIDURAL; INFILTRATION; INTRACAUDAL; PERINEURAL at 15:32

## 2022-03-15 RX ADMIN — CEFAZOLIN SODIUM 2000 MG: 10 INJECTION, POWDER, FOR SOLUTION INTRAVENOUS at 15:24

## 2022-03-15 RX ADMIN — Medication 100 MCG: at 15:49

## 2022-03-15 RX ADMIN — APREPITANT 40 MG: 40 CAPSULE ORAL at 12:56

## 2022-03-15 RX ADMIN — Medication 170 MG: at 15:32

## 2022-03-15 RX ADMIN — FENTANYL CITRATE 50 MCG: 50 INJECTION, SOLUTION INTRAMUSCULAR; INTRAVENOUS at 16:49

## 2022-03-15 RX ADMIN — SODIUM CHLORIDE: 9 INJECTION, SOLUTION INTRAVENOUS at 16:14

## 2022-03-15 RX ADMIN — FENTANYL CITRATE 50 MCG: 50 INJECTION, SOLUTION INTRAMUSCULAR; INTRAVENOUS at 15:32

## 2022-03-15 RX ADMIN — DEXAMETHASONE SODIUM PHOSPHATE 4 MG: 4 INJECTION, SOLUTION INTRAMUSCULAR; INTRAVENOUS at 15:40

## 2022-03-15 RX ADMIN — MIDAZOLAM 2 MG: 1 INJECTION INTRAMUSCULAR; INTRAVENOUS at 15:20

## 2022-03-15 RX ADMIN — SODIUM CHLORIDE, POTASSIUM CHLORIDE, SODIUM LACTATE AND CALCIUM CHLORIDE: 600; 310; 30; 20 INJECTION, SOLUTION INTRAVENOUS at 12:45

## 2022-03-15 RX ADMIN — Medication 20 MG: at 16:20

## 2022-03-15 RX ADMIN — ONDANSETRON 4 MG: 2 INJECTION INTRAMUSCULAR; INTRAVENOUS at 15:40

## 2022-03-15 RX ADMIN — VECURONIUM BROMIDE 10 MG: 1 INJECTION, POWDER, LYOPHILIZED, FOR SOLUTION INTRAVENOUS at 15:40

## 2022-03-15 RX ADMIN — HYDROMORPHONE HYDROCHLORIDE 0.5 MG: 2 INJECTION, SOLUTION INTRAMUSCULAR; INTRAVENOUS; SUBCUTANEOUS at 17:24

## 2022-03-15 RX ADMIN — SODIUM CHLORIDE: 9 INJECTION, SOLUTION INTRAVENOUS at 15:27

## 2022-03-15 RX ADMIN — SUGAMMADEX 200 MG: 100 INJECTION, SOLUTION INTRAVENOUS at 16:37

## 2022-03-15 ASSESSMENT — PULMONARY FUNCTION TESTS
PIF_VALUE: 76
PIF_VALUE: 22
PIF_VALUE: 22
PIF_VALUE: 19
PIF_VALUE: 18
PIF_VALUE: 20
PIF_VALUE: 21
PIF_VALUE: 22
PIF_VALUE: 21
PIF_VALUE: 22
PIF_VALUE: 21
PIF_VALUE: 21
PIF_VALUE: 1
PIF_VALUE: 22
PIF_VALUE: 2
PIF_VALUE: 22
PIF_VALUE: 22
PIF_VALUE: 19
PIF_VALUE: 21
PIF_VALUE: 22
PIF_VALUE: 1
PIF_VALUE: 20
PIF_VALUE: 2
PIF_VALUE: 21
PIF_VALUE: 20
PIF_VALUE: 22
PIF_VALUE: 22
PIF_VALUE: 1
PIF_VALUE: 22
PIF_VALUE: 23
PIF_VALUE: 21
PIF_VALUE: 20
PIF_VALUE: 22
PIF_VALUE: 21
PIF_VALUE: 22
PIF_VALUE: 20
PIF_VALUE: 18
PIF_VALUE: 21
PIF_VALUE: 20
PIF_VALUE: 22
PIF_VALUE: 21
PIF_VALUE: 22
PIF_VALUE: 22
PIF_VALUE: 21
PIF_VALUE: 22
PIF_VALUE: 1
PIF_VALUE: 13
PIF_VALUE: 22
PIF_VALUE: 0
PIF_VALUE: 22
PIF_VALUE: 1
PIF_VALUE: 21
PIF_VALUE: 22
PIF_VALUE: 21
PIF_VALUE: 21
PIF_VALUE: 20
PIF_VALUE: 22
PIF_VALUE: 3
PIF_VALUE: 19
PIF_VALUE: 23
PIF_VALUE: 13
PIF_VALUE: 22
PIF_VALUE: 69
PIF_VALUE: 21
PIF_VALUE: 3
PIF_VALUE: 22
PIF_VALUE: 22
PIF_VALUE: 21
PIF_VALUE: 2
PIF_VALUE: 4
PIF_VALUE: 20
PIF_VALUE: 64
PIF_VALUE: 22
PIF_VALUE: 21

## 2022-03-15 ASSESSMENT — PAIN SCALES - GENERAL
PAINLEVEL_OUTOF10: 5
PAINLEVEL_OUTOF10: 7
PAINLEVEL_OUTOF10: 5
PAINLEVEL_OUTOF10: 0
PAINLEVEL_OUTOF10: 5
PAINLEVEL_OUTOF10: 7
PAINLEVEL_OUTOF10: 4
PAINLEVEL_OUTOF10: 0
PAINLEVEL_OUTOF10: 4
PAINLEVEL_OUTOF10: 0

## 2022-03-15 ASSESSMENT — PAIN - FUNCTIONAL ASSESSMENT: PAIN_FUNCTIONAL_ASSESSMENT: 0-10

## 2022-03-15 ASSESSMENT — LIFESTYLE VARIABLES: SMOKING_STATUS: 0

## 2022-03-15 NOTE — PROGRESS NOTES
Discharge instructions reviewed with patient/responsible adult. All home medications have been reviewed, questions answered and patient verbalized understanding. Discharge instructions signed and copies given. Patient discharged home with belongings.    Patient has all belongings, taken to lobby via w/c wife to drive home

## 2022-03-15 NOTE — H&P
Date of Surgery Update:  Colvin Lombard was seen, history and physical examination reviewed, and patient examined by me today. There have been no significant clinical changes since the completion of the previous history and physical.    The risk, benefits, and alternatives of the proposed procedure have been explained to the patient (or appropriate guardian) and understanding verbalized. All questions answered. Patient wishes to proceed.     Electronically signed by: Brenden Ivory MD,3/15/2022,2:35 PM

## 2022-03-15 NOTE — PROGRESS NOTES
Patient admitted to PACU via stretcher, arouses to stimuli, moves ext to command. Respirations adeq on 6L o2 per simple mask spo2 100%. Skin warm and dry with good color. abd soft. Back drsg dry and intact. Patient denies pain at this time, will continue to monitor.

## 2022-03-15 NOTE — BRIEF OP NOTE
Brief Postoperative Note      Patient: Vani Ca  YOB: 1970  MRN: [id number]    Date of Procedure: 3/15/2022    Pre-Op Diagnosis: M51.16  HERNIATED LUMBAR DISC WITH RADICULOPATHY    Post-Op Diagnosis: Same       Procedure(s):  LEFT LUMBAR5-SACRAL1 MICRO HEMILAMINECTOMY AND DISCECTOMY (32964, 41066)    Surgeon(s):  Cristian Feliz MD    Assistant:  First Assistant: Dee Flores    Anesthesia: General    Estimated Blood Loss (mL): less than 50     Complications: None    Specimens:   * No specimens in log *    Implants:  * No implants in log *      Drains: * No LDAs found *    Findings: HNP L5S1    Electronically signed by Cristian Feliz MD on 3/15/2022 at 4:45 PM

## 2022-03-15 NOTE — OP NOTE
[facility] OR  3/15/2022 4:48 PM    PATIENT NAME:          Tori Dias  YOB: 1970   MEDICAL RECORD#         [id number]  SURGERY DATE:         3/15/2022  SURGEON:                 Stanislav Rodrigues MD                                                      OPERATIVE REPORT     PREOPERATIVE DIAGNOSIS: herniated lumbar disc L5-S1 on the left            POSTOPERATIVE DIAGNOSIS:    same    PROCEDURES PERFORMED:  1. Left L5-S1 hemilaminotomy and diskectomy  2. Microdissection using the operating room microscope. ANESTHESIA:  General    ESTIMATED BLOOD LOSS: 50  cc    INDICATIONS FOR SURGERY:   Tori Dias is a 47 y.o. male with back and leg pain. The symptoms failed to respond to conservative intervention. An MRI scan was performed and this showed evidence of a disk herniation at the L5-S1 level on the left. Having failed conservative management and experiencing persistent symptoms, the patient elected to proceed ahead with the surgical option of microdiskectomy at L5-S1. DETAILS OF PROCEDURE:  The patient was brought to the operating room and placed under general anesthesia. The patient was then placed prone on a Soule[place] frame. All bony prominences were inspected and padded prior to sterile draping. Using a #15 blade knife, the skin was incised in the midline and monopolar cautery was used to dissect through the subcutaneous tissue to open the fascia and reflect the paraspinal muscles laterally exposing the L5-S1 interspace on the left side. The microscope was then brought into the field and used to assist with performing a microsurgical diskectomy at this level. Using the Midas Erickson drill, I burred down the hemilamina of  L5 and a more significant portion of the inferior S1 lamina. I exposed beyond the pedicle of L5 where the disk herniation was located.   The underlying ligamentum flavum was freed with the micronerve hook from the underlying dura and removed with the 2 mm punch laterally, exposing the lateral dural tube and takeoff of the S1 nerve root. The S1 nerve root was noted to be dorsally displaced. I gently retracted the nerve root medially and found a subligamentous disk herniation directly on the takeoff of the nerve roots. An incision was made in the ligamentous tissue and the fragment immediately presented itself for removal.  The pituitary forceps were used to further explore the interspace and additional loose fragments were removed. The wound was copiously irrigated with antibiotic solution. Duramorph paste was placed in the epidural space and 0.5% Marcaine in subcutaneous tissues for analgesia. The fascia was then reapproximated using interrupted 0 Vicryl sutures and interrupted 3-0 Vicryl sutures were used to reapproximate the subcuticular layer. A sterile dressing was then applied. The patient was extubated in the operating room and transferred to the recovery room in stable condition. There were no complications. In accordance with CMS guidelines, I attest that I was present for the entire procedure from the creation of the skin incision to the closure.

## 2022-03-15 NOTE — ANESTHESIA PRE PROCEDURE
Department of Anesthesiology  Preprocedure Note       Name:  Guevara Jimenez   Age:  47 y.o.  :  1970                                          MRN:  [id number]         Date:  3/15/2022      Surgeon: Ash Burdick):  Karmen Fierro MD    Procedure: Procedure(s):  LEFT LUMBAR5-SACRAL1 MICRO HEMILAMINECTOMY AND DISCECTOMY (76183, 15465)    Medications prior to admission:   Prior to Admission medications    Medication Sig Start Date End Date Taking? Authorizing Provider   doxazosin (CARDURA) 1 MG tablet Take 1 tablet by mouth nightly 11/10/21  Yes Olivia Rock MD   amLODIPine (NORVASC) 10 MG tablet Take 10 mg by mouth daily 4/24/21 3/10/22 Yes Historical Provider, MD   HYDROcodone-acetaminophen (NORCO) 5-325 MG per tablet Take 1 tablet by mouth daily as needed. Yes Historical Provider, MD   insulin NPH (HUMULIN N;NOVOLIN N) 100 UNIT/ML injection vial Inject 15-20 Units into the skin 2 times daily (before meals)    Yes Historical Provider, MD       Current medications:    Current Facility-Administered Medications   Medication Dose Route Frequency Provider Last Rate Last Admin    meperidine (DEMEROL) injection 12.5 mg  12.5 mg IntraVENous Q5 Min PRN Letty Christensen MD        HYDROmorphone (DILAUDID) injection 0.5 mg  0.5 mg IntraVENous Q5 Min PRN Letty Christensen MD        oxyCODONE (ROXICODONE) immediate release tablet 5 mg  5 mg Oral Once PRN Letty Christensen MD        ondansetron [facility]) injection 4 mg  4 mg IntraVENous Once PRN Letty Christensen MD        labetalol (NORMODYNE;TRANDATE) injection 10 mg  10 mg IntraVENous Q15 Min PRN Letty Christensen MD        Or    hydrALAZINE (APRESOLINE) injection 10 mg  10 mg IntraVENous Q15 Min PRN Letty Christensen MD        aprepitant (EMEND) capsule 40 mg  40 mg Oral Once Letty Christensen MD        acetaminophen (TYLENOL) tablet 650 mg  650 mg Oral Once Letty Christensen MD           Allergies:     Allergies   Allergen Reactions    Sulfa Antibiotics Hives       Problem List:  There is no problem list on file for this patient. Past Medical History:        Diagnosis Date    Anxiety     Aortic valve stenosis     Hepatitis C     not treated    Hypertensive crisis     ad[facility]    Lumbar disc herniation     Type 2 diabetes mellitus ([facility] [address]        Past Surgical History:        Procedure Laterality Date    CERVICAL DISCECTOMY  2009    Anterior cervical Disectomy, limited neck ROM    HERNIA REPAIR Left        Social History:    Social History     Tobacco Use    Smoking status: Never Smoker    Smokeless tobacco: Never Used   Substance Use Topics    Alcohol use: Yes     Comment: 3 drinks/day                                Counseling given: Not Answered      Vital Signs (Current):   Vitals:    03/10/22 1518   Weight: 210 lb (95.3 kg)   Height: 5' 10\" (1.778 m)                                              BP Readings from Last 3 Encounters:   11/10/21 (!) 150/90   05/10/21 (!) 142/88       NPO Status:                                                                                 BMI:   Wt Readings from Last 3 Encounters:   03/10/22 210 lb (95.3 kg)   11/10/21 205 lb (93 kg)   10/26/21 198 lb (89.8 kg)     Body mass index is 30.13 kg/m². CBC:   Lab Results   Component Value Date    WBC 4.1 03/09/2022    RBC 4.60 03/09/2022    HGB 13.8 03/09/2022    HCT 40.3 03/09/2022    MCV 87.6 03/09/2022    RDW 12.9 03/09/2022     03/09/2022       CMP:   Lab Results   Component Value Date     03/09/2022    K 5.0 03/09/2022     03/09/2022    CO2 23 03/09/2022    BUN 27 03/09/2022    CREATININE 1.6 03/09/2022    GFRAA 55 03/09/2022    LABGLOM 45 03/09/2022    GLUCOSE 141 03/09/2022    CALCIUM 9.7 03/09/2022       POC Tests: No results for input(s): POCGLU, POCNA, POCK, POCCL, POCBUN, POCHEMO, POCHCT in the last 72 hours.     Coags:   Lab Results   Component Value Date    PROTIME 10.1 03/09/2022    INR 0.90 03/09/2022    APTT 36.5 03/09/2022       HCG (If Applicable): No results found for: PREGTESTUR, PREGSERUM, HCG, HCGQUANT     ABGs: No results found for: PHART, PO2ART, ACN2LBK, YLN9ZDL, BEART, T1RFWQOP     Type & Screen (If Applicable):  No results found for: LABABO, LABRH    Drug/Infectious Status (If Applicable):  No results found for: HIV, HEPCAB    COVID-19 Screening (If Applicable):   Lab Results   Component Value Date    COVID19 Not Detected 03/09/2022           Anesthesia Evaluation  Patient summary reviewed and Nursing notes reviewed no history of anesthetic complications:   Airway: Mallampati: III  TM distance: >3 FB   Neck ROM: full  Mouth opening: > = 3 FB Dental: normal exam         Pulmonary:Negative Pulmonary ROS and normal exam  breath sounds clear to auscultation      (-) COPD, asthma, sleep apnea and not a current smoker                           Cardiovascular:    (+) hypertension:, valvular problems/murmurs (echo 2021 EF 55-60 no rwma gr 1 dd mod as): AS, murmur,     (-) past MI, CAD, CABG/stent, dysrhythmias,  angina and  CHF    ECG reviewed  Rhythm: regular  Rate: normal  Echocardiogram reviewed                  Neuro/Psych:   (+) neuromuscular disease (lumbar ddd):, depression/anxiety    (-) seizures, TIA and CVA           GI/Hepatic/Renal:   (+) hepatitis (hep c, no txmt hx, no s/s of cirrhosis): C, renal disease: CRI,      (-) GERD       Endo/Other:    (+) Diabetes (a1c 8.4 per pt)Type II DM, using insulin, .    (-) hypothyroidism, hyperthyroidism               Abdominal:   (+) obese,           Vascular:   + PVD, aortic or cerebral (4/24/21> BICA 16-49% stenosis), . Other Findings:           Anesthesia Plan      general     ASA 3       Induction: intravenous. MIPS: Postoperative opioids intended and Prophylactic antiemetics administered. Anesthetic plan and risks discussed with patient. Plan discussed with CRNA.                   Dawit Childers MD   3/15/2022

## 2022-05-05 ENCOUNTER — TELEPHONE (OUTPATIENT)
Dept: ORTHOPEDIC SURGERY | Age: 52
End: 2022-05-05

## 2022-07-21 ENCOUNTER — HOSPITAL ENCOUNTER (OUTPATIENT)
Dept: MRI IMAGING | Age: 52
Discharge: HOME | End: 2022-07-21

## 2022-07-21 DIAGNOSIS — M51.16 HERNIATION OF LUMBAR INTERVERTEBRAL DISC WITH RADICULOPATHY: ICD-10-CM

## 2022-07-21 DIAGNOSIS — Z48.811 AFTERCARE FOLLOWING SURGERY OF THE NERVOUS SYSTEM: ICD-10-CM

## 2022-07-29 ENCOUNTER — HOSPITAL ENCOUNTER (OUTPATIENT)
Age: 52
Discharge: HOME | End: 2022-07-29
Payer: COMMERCIAL

## 2022-07-29 LAB
BUN BLDV-MCNC: 27 MG/DL (ref 7–20)
CREAT SERPL-MCNC: 1.7 MG/DL (ref 0.9–1.3)
GFR AFRICAN AMERICAN: 51
GFR NON-AFRICAN AMERICAN: 42

## 2022-07-29 PROCEDURE — 84520 ASSAY OF UREA NITROGEN: CPT

## 2022-07-29 PROCEDURE — 82565 ASSAY OF CREATININE: CPT

## 2022-07-29 PROCEDURE — 36415 COLL VENOUS BLD VENIPUNCTURE: CPT

## 2022-08-05 ENCOUNTER — HOSPITAL ENCOUNTER (OUTPATIENT)
Dept: MRI IMAGING | Age: 52
Discharge: HOME | End: 2022-08-05
Payer: COMMERCIAL

## 2022-08-05 DIAGNOSIS — M51.16 HERNIATION OF LUMBAR INTERVERTEBRAL DISC WITH RADICULOPATHY: ICD-10-CM

## 2022-08-05 DIAGNOSIS — Z48.811 AFTERCARE FOLLOWING SURGERY OF THE NERVOUS SYSTEM: ICD-10-CM

## 2022-08-05 PROCEDURE — 72158 MRI LUMBAR SPINE W/O & W/DYE: CPT

## 2022-08-05 PROCEDURE — A9577 INJ MULTIHANCE: HCPCS | Performed by: NEUROLOGICAL SURGERY

## 2022-08-05 PROCEDURE — 6360000004 HC RX CONTRAST MEDICATION: Performed by: NEUROLOGICAL SURGERY

## 2022-08-05 RX ADMIN — GADOBENATE DIMEGLUMINE 18 ML: 529 INJECTION, SOLUTION INTRAVENOUS at 07:51

## 2023-05-08 PROBLEM — N18.31 STAGE 3A CHRONIC KIDNEY DISEASE (CKD) (HCC): Status: ACTIVE | Noted: 2023-05-08

## 2023-05-08 PROBLEM — I10 PRIMARY HYPERTENSION: Status: ACTIVE | Noted: 2023-05-08

## 2023-06-13 PROBLEM — R80.0 ISOLATED PROTEINURIA WITHOUT SPECIFIC MORPHOLOGIC LESION: Status: ACTIVE | Noted: 2023-06-13

## 2023-10-25 ENCOUNTER — HOSPITAL ENCOUNTER (OUTPATIENT)
Age: 53
Discharge: HOME | End: 2023-10-25
Payer: COMMERCIAL

## 2023-10-25 DIAGNOSIS — R80.0 ISOLATED PROTEINURIA WITHOUT SPECIFIC MORPHOLOGIC LESION: ICD-10-CM

## 2023-10-25 LAB
APTT BLD: 26.1 SEC (ref 22.7–35.9)
DEPRECATED RDW RBC AUTO: 12.6 % (ref 12.4–15.4)
HCT VFR BLD AUTO: 41.5 % (ref 40.5–52.5)
HGB BLD-MCNC: 14.3 G/DL (ref 13.5–17.5)
INR PPP: 0.81 (ref 0.84–1.16)
MCH RBC QN AUTO: 30.1 PG (ref 26–34)
MCHC RBC AUTO-ENTMCNC: 34.5 G/DL (ref 31–36)
MCV RBC AUTO: 87.3 FL (ref 80–100)
PLATELET # BLD AUTO: 222 K/UL (ref 135–450)
PMV BLD AUTO: 8.5 FL (ref 5–10.5)
PROTHROMBIN TIME: 11.2 SEC (ref 11.5–14.8)
RBC # BLD AUTO: 4.75 M/UL (ref 4.2–5.9)
WBC # BLD AUTO: 4.3 K/UL (ref 4–11)

## 2023-10-25 PROCEDURE — 85027 COMPLETE CBC AUTOMATED: CPT

## 2023-10-25 PROCEDURE — 85730 THROMBOPLASTIN TIME PARTIAL: CPT

## 2023-10-25 PROCEDURE — 36415 COLL VENOUS BLD VENIPUNCTURE: CPT

## 2023-10-25 PROCEDURE — 85610 PROTHROMBIN TIME: CPT

## 2023-10-31 ENCOUNTER — TELEPHONE (OUTPATIENT)
Dept: INTERVENTIONAL RADIOLOGY/VASCULAR | Age: 53
End: 2023-10-31

## 2023-11-29 ENCOUNTER — TELEPHONE (OUTPATIENT)
Dept: INTERVENTIONAL RADIOLOGY/VASCULAR | Age: 53
End: 2023-11-29

## 2023-12-06 ENCOUNTER — HOSPITAL ENCOUNTER (EMERGENCY)
Age: 53
Discharge: LEFT BEFORE DISCHARGE | End: 2023-12-06
Attending: EMERGENCY MEDICINE
Payer: COMMERCIAL

## 2023-12-06 VITALS
DIASTOLIC BLOOD PRESSURE: 102 MMHG | RESPIRATION RATE: 20 BRPM | HEART RATE: 100 BPM | TEMPERATURE: 98.1 F | WEIGHT: 200 LBS | OXYGEN SATURATION: 98 % | HEIGHT: 70 IN | SYSTOLIC BLOOD PRESSURE: 188 MMHG | BODY MASS INDEX: 28.63 KG/M2

## 2023-12-06 VITALS
HEART RATE: 79 BPM | DIASTOLIC BLOOD PRESSURE: 100 MMHG | BODY MASS INDEX: 28.63 KG/M2 | WEIGHT: 200 LBS | TEMPERATURE: 97.7 F | RESPIRATION RATE: 18 BRPM | SYSTOLIC BLOOD PRESSURE: 194 MMHG | HEIGHT: 70 IN | OXYGEN SATURATION: 99 %

## 2023-12-06 DIAGNOSIS — I10 UNCONTROLLED HYPERTENSION: Primary | ICD-10-CM

## 2023-12-06 LAB
ANION GAP SERPL CALCULATED.3IONS-SCNC: 7 MMOL/L (ref 3–16)
BUN SERPL-MCNC: 22 MG/DL (ref 7–20)
CALCIUM SERPL-MCNC: 9.5 MG/DL (ref 8.3–10.6)
CHLORIDE SERPL-SCNC: 106 MMOL/L (ref 99–110)
CHP ED QC CHECK: NORMAL
CO2 SERPL-SCNC: 28 MMOL/L (ref 21–32)
CREAT SERPL-MCNC: 1.9 MG/DL (ref 0.9–1.3)
DEPRECATED RDW RBC AUTO: 12.7 % (ref 12.4–15.4)
EKG ATRIAL RATE: 99 BPM
EKG DIAGNOSIS: NORMAL
EKG P AXIS: 64 DEGREES
EKG P-R INTERVAL: 140 MS
EKG Q-T INTERVAL: 344 MS
EKG QRS DURATION: 72 MS
EKG QTC CALCULATION (BAZETT): 441 MS
EKG R AXIS: -42 DEGREES
EKG T AXIS: 70 DEGREES
EKG VENTRICULAR RATE: 99 BPM
GFR SERPLBLD CREATININE-BSD FMLA CKD-EPI: 41 ML/MIN/{1.73_M2}
GLUCOSE SERPL-MCNC: 216 MG/DL (ref 70–99)
HCT VFR BLD AUTO: 41 % (ref 40.5–52.5)
HGB BLD-MCNC: 14 G/DL (ref 13.5–17.5)
INR PPP: 0.84 (ref 0.84–1.16)
MCH RBC QN AUTO: 29.8 PG (ref 26–34)
MCHC RBC AUTO-ENTMCNC: 34 G/DL (ref 31–36)
MCV RBC AUTO: 87.7 FL (ref 80–100)
PLATELET # BLD AUTO: 240 K/UL (ref 135–450)
PMV BLD AUTO: 8.1 FL (ref 5–10.5)
POTASSIUM SERPL-SCNC: 4.6 MMOL/L (ref 3.5–5.1)
PROTHROMBIN TIME: 11.6 SEC (ref 11.5–14.8)
RBC # BLD AUTO: 4.68 M/UL (ref 4.2–5.9)
SODIUM SERPL-SCNC: 141 MMOL/L (ref 136–145)
WBC # BLD AUTO: 4.2 K/UL (ref 4–11)

## 2023-12-06 PROCEDURE — 99284 EMERGENCY DEPT VISIT MOD MDM: CPT

## 2023-12-06 PROCEDURE — 36415 COLL VENOUS BLD VENIPUNCTURE: CPT

## 2023-12-06 PROCEDURE — 93010 ELECTROCARDIOGRAM REPORT: CPT | Performed by: INTERNAL MEDICINE

## 2023-12-06 PROCEDURE — 85027 COMPLETE CBC AUTOMATED: CPT

## 2023-12-06 PROCEDURE — 80048 BASIC METABOLIC PNL TOTAL CA: CPT

## 2023-12-06 PROCEDURE — 6360000002 HC RX W HCPCS: Performed by: RADIOLOGY

## 2023-12-06 PROCEDURE — 93005 ELECTROCARDIOGRAM TRACING: CPT | Performed by: EMERGENCY MEDICINE

## 2023-12-06 PROCEDURE — 85610 PROTHROMBIN TIME: CPT

## 2023-12-06 PROCEDURE — 96374 THER/PROPH/DIAG INJ IV PUSH: CPT

## 2023-12-06 RX ORDER — DIAZEPAM 5 MG/1
TABLET ORAL
COMMUNITY
Start: 2023-12-05

## 2023-12-06 RX ORDER — HYDRALAZINE HYDROCHLORIDE 20 MG/ML
10 INJECTION INTRAMUSCULAR; INTRAVENOUS ONCE
Status: DISCONTINUED | OUTPATIENT
Start: 2023-12-06 | End: 2023-12-06

## 2023-12-06 RX ORDER — HYDROCODONE BITARTRATE AND ACETAMINOPHEN 5; 325 MG/1; MG/1
TABLET ORAL
COMMUNITY
Start: 2023-12-04

## 2023-12-06 RX ORDER — HYDRALAZINE HYDROCHLORIDE 20 MG/ML
20 INJECTION INTRAMUSCULAR; INTRAVENOUS ONCE
Status: COMPLETED | OUTPATIENT
Start: 2023-12-06 | End: 2023-12-06

## 2023-12-06 RX ADMIN — HYDRALAZINE HYDROCHLORIDE 20 MG: 20 INJECTION, SOLUTION INTRAMUSCULAR; INTRAVENOUS at 11:38

## 2023-12-06 ASSESSMENT — PAIN - FUNCTIONAL ASSESSMENT: PAIN_FUNCTIONAL_ASSESSMENT: 0-10

## 2023-12-06 ASSESSMENT — PAIN SCALES - GENERAL: PAINLEVEL_OUTOF10: 0

## 2023-12-06 NOTE — PROGRESS NOTES
Pt arrives to pre procedure area in stable condition from home. Vital signs stable. Assessment completed see flow sheet. IV patent. NS hung at 50 cc/hr. Procedure explained to pt who states understanding and questions answered.

## 2023-12-06 NOTE — ED NOTES
Patient stated he wanted his IV out so he can get his IV out. This RN to take IV out, patient states \"wow you're not even going to try to talk me into leaving\". Informed he is alert and oriented and able to make is own decisions and can leave if he wants to leave. IV removed and patient walked off with steady gait.       Neil Owens RN  12/06/23 [id number]

## 2023-12-07 ENCOUNTER — HOSPITAL ENCOUNTER (OUTPATIENT)
Dept: CT IMAGING | Age: 53
Discharge: HOME | End: 2023-12-06
Payer: COMMERCIAL

## 2023-12-07 DIAGNOSIS — N18.31 STAGE 3A CHRONIC KIDNEY DISEASE (CKD) (HCC): ICD-10-CM

## (undated) DEVICE — SUTURE VCRL + SZ 2-0 L18IN ABSRB UD CT1 L36MM 1/2 CIR VCP839D

## (undated) DEVICE — POSITIONER HD SFT TCH BERRY FOAM DEVON

## (undated) DEVICE — 3M™ TEGADERM™ TRANSPARENT FILM DRESSING FRAME STYLE, 1628, 6 IN X 8 IN (15 CM X 20 CM), 10/CT 8CT/CASE: Brand: 3M™ TEGADERM™

## (undated) DEVICE — DRAPE MICSCP W132XL406CM LENS DIA68MM W VARI LENS2 FOR LEICA

## (undated) DEVICE — SUTURE MCRYL + SZ 4-0 L27IN ABSRB UD L19MM PS-2 3/8 CIR MCP426H

## (undated) DEVICE — GAUZE,SPONGE,4"X4",8PLY,STRL,LF,10/TRAY: Brand: MEDLINE

## (undated) DEVICE — TOWEL,OR,DSP,ST,BLUE,STD,4/PK,20PK/CS: Brand: MEDLINE

## (undated) DEVICE — APPLICATOR PREP 26ML 0.7% IOD POVACRYLEX 74% ISO ALC ST

## (undated) DEVICE — BLANKET WRM W29.9XL79.1IN UP BODY FORC AIR MISTRAL-AIR

## (undated) DEVICE — PROTECTOR EYE PT SELF ADH NS OPT GRD LF

## (undated) DEVICE — MERCY FAIRFIELD TURNOVER KIT: Brand: MEDLINE INDUSTRIES, INC.

## (undated) DEVICE — C-ARM: Brand: UNBRANDED

## (undated) DEVICE — SURGICAL PROCEDURE PACK NEURO DRP CUST

## (undated) DEVICE — SOLUTION IRRIG 1000ML 0.9% SOD CHL USP POUR PLAS BTL

## (undated) DEVICE — SURE SET SINGLE BASIN-LF: Brand: MEDLINE INDUSTRIES, INC.

## (undated) DEVICE — DRAPE,LAP,CHOLE,W/TROUGHS,STERILE: Brand: MEDLINE

## (undated) DEVICE — SUTURE VCRL + SZ 0 L18IN ABSRB UD L36MM CT-1 1/2 CIR VCP840D

## (undated) DEVICE — ELECTRODE PT RET AD L9FT HI MOIST COND ADH HYDRGEL CORDED

## (undated) DEVICE — SOLUTION IV IRRIG WATER 1000ML POUR BRL 2F7114

## (undated) DEVICE — STERILE LATEX POWDER-FREE SURGICAL GLOVESWITH NITRILE AND EMOLLIENT COATINGS: Brand: PROTEXIS

## (undated) DEVICE — TRAY PREP DRY W/ PREM GLV 2 APPL 6 SPNG 2 UNDPD 1 OVERWRAP

## (undated) DEVICE — DRESSING PETRO W3XL3IN OIL EMUL N ADH GZ KNIT IMPREG CELOS

## (undated) DEVICE — STAPLER SKIN H3.9MM WIRE DIA0.58MM CRWN 6.9MM 35 STPL ROT

## (undated) DEVICE — LOTION PREP REMV 5OZ IODO CLR TINC OF BENZ DURAPREP

## (undated) DEVICE — GAUZE,SPONGE,4"X4",16PLY,XRAY,STRL,LF: Brand: MEDLINE

## (undated) DEVICE — 3.0MM NEURO (MATCH HEAD)

## (undated) DEVICE — ARM CRADLE: Brand: DEVON